# Patient Record
Sex: MALE | URBAN - METROPOLITAN AREA
[De-identification: names, ages, dates, MRNs, and addresses within clinical notes are randomized per-mention and may not be internally consistent; named-entity substitution may affect disease eponyms.]

---
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
## 2021-01-04 ENCOUNTER — APPOINTMENT (RX ONLY)
Dept: URBAN - METROPOLITAN AREA CLINIC 40 | Facility: CLINIC | Age: 21
Setting detail: DERMATOLOGY
End: 2021-01-04

## 2021-01-04 DIAGNOSIS — L80 VITILIGO: ICD-10-CM

## 2021-01-04 PROCEDURE — ? COUNSELING

## 2021-01-04 PROCEDURE — ? XTRAC LASER

## 2021-01-04 PROCEDURE — 96999 UNLISTED SPEC DERM SVC/PX: CPT

## 2021-01-04 PROCEDURE — ? ADDITIONAL NOTES

## 2021-01-04 ASSESSMENT — LOCATION DETAILED DESCRIPTION DERM
LOCATION DETAILED: DORSAL GLANS
LOCATION DETAILED: DORSAL PENILE SHAFT

## 2021-01-04 ASSESSMENT — LOCATION ZONE DERM: LOCATION ZONE: PENIS

## 2021-01-04 ASSESSMENT — LOCATION SIMPLE DESCRIPTION DERM: LOCATION SIMPLE: PENIS

## 2021-01-04 NOTE — PROCEDURE: XTRAC LASER
Billing: [id number] (Unlisted special dermatological service or procedure) Billing: [id number] (Unlisted special dermatological service or procedure)

## 2021-01-06 ENCOUNTER — APPOINTMENT (RX ONLY)
Dept: URBAN - METROPOLITAN AREA CLINIC 40 | Facility: CLINIC | Age: 21
Setting detail: DERMATOLOGY
End: 2021-01-06

## 2021-01-06 DIAGNOSIS — L80 VITILIGO: ICD-10-CM

## 2021-01-06 PROCEDURE — ? ADDITIONAL NOTES

## 2021-01-06 PROCEDURE — ? XTRAC LASER

## 2021-01-06 PROCEDURE — 96999 UNLISTED SPEC DERM SVC/PX: CPT

## 2021-01-06 PROCEDURE — ? COUNSELING

## 2021-01-06 ASSESSMENT — LOCATION SIMPLE DESCRIPTION DERM: LOCATION SIMPLE: PENIS

## 2021-01-06 ASSESSMENT — LOCATION DETAILED DESCRIPTION DERM
LOCATION DETAILED: DORSAL GLANS
LOCATION DETAILED: DORSAL PENILE SHAFT

## 2021-01-06 ASSESSMENT — LOCATION ZONE DERM: LOCATION ZONE: PENIS

## 2021-01-06 NOTE — PROCEDURE: XTRAC LASER
Billing: [id number] (Unlisted special dermatological service or procedure) Billing: [id number] (Unlisted special dermatological service or procedure)

## 2021-01-08 ENCOUNTER — APPOINTMENT (RX ONLY)
Dept: URBAN - METROPOLITAN AREA CLINIC 40 | Facility: CLINIC | Age: 21
Setting detail: DERMATOLOGY
End: 2021-01-08

## 2021-01-08 DIAGNOSIS — L80 VITILIGO: ICD-10-CM

## 2021-01-08 PROCEDURE — ? COUNSELING

## 2021-01-08 PROCEDURE — ? XTRAC LASER

## 2021-01-08 PROCEDURE — 96999 UNLISTED SPEC DERM SVC/PX: CPT

## 2021-01-08 ASSESSMENT — LOCATION DETAILED DESCRIPTION DERM
LOCATION DETAILED: DORSAL PENILE SHAFT
LOCATION DETAILED: DORSAL GLANS

## 2021-01-08 ASSESSMENT — LOCATION SIMPLE DESCRIPTION DERM: LOCATION SIMPLE: PENIS

## 2021-01-08 ASSESSMENT — LOCATION ZONE DERM: LOCATION ZONE: PENIS

## 2021-01-08 NOTE — PROCEDURE: XTRAC LASER
Billing: [id number] (Unlisted special dermatological service or procedure) Billing: [id number] (Unlisted special dermatological service or procedure)

## 2021-01-18 ENCOUNTER — APPOINTMENT (RX ONLY)
Dept: URBAN - METROPOLITAN AREA CLINIC 40 | Facility: CLINIC | Age: 21
Setting detail: DERMATOLOGY
End: 2021-01-18

## 2021-01-18 DIAGNOSIS — L80 VITILIGO: ICD-10-CM

## 2021-01-18 PROCEDURE — ? COUNSELING

## 2021-01-18 PROCEDURE — ? XTRAC LASER

## 2021-01-18 PROCEDURE — 96999 UNLISTED SPEC DERM SVC/PX: CPT

## 2021-01-18 ASSESSMENT — LOCATION SIMPLE DESCRIPTION DERM: LOCATION SIMPLE: PENIS

## 2021-01-18 ASSESSMENT — LOCATION DETAILED DESCRIPTION DERM
LOCATION DETAILED: DORSAL PENILE SHAFT
LOCATION DETAILED: DORSAL GLANS

## 2021-01-18 ASSESSMENT — LOCATION ZONE DERM: LOCATION ZONE: PENIS

## 2021-01-18 NOTE — PROCEDURE: XTRAC LASER
Billing: [id number] (Unlisted special dermatological service or procedure) Billing: [id number] (Unlisted special dermatological service or procedure)

## 2021-03-08 ENCOUNTER — APPOINTMENT (RX ONLY)
Dept: URBAN - METROPOLITAN AREA CLINIC 40 | Facility: CLINIC | Age: 21
Setting detail: DERMATOLOGY
End: 2021-03-08

## 2021-03-08 DIAGNOSIS — L80 VITILIGO: ICD-10-CM

## 2021-03-08 PROCEDURE — 96999 UNLISTED SPEC DERM SVC/PX: CPT

## 2021-03-08 PROCEDURE — ? XTRAC LASER

## 2021-03-08 PROCEDURE — ? COUNSELING

## 2021-03-08 ASSESSMENT — LOCATION SIMPLE DESCRIPTION DERM: LOCATION SIMPLE: PENIS

## 2021-03-08 ASSESSMENT — LOCATION DETAILED DESCRIPTION DERM
LOCATION DETAILED: DORSAL PENILE SHAFT
LOCATION DETAILED: DORSAL GLANS

## 2021-03-08 ASSESSMENT — LOCATION ZONE DERM: LOCATION ZONE: PENIS

## 2021-03-08 NOTE — PROCEDURE: XTRAC LASER
Billing: [id number] (Unlisted special dermatological service or procedure) Billing: [id number] (Unlisted special dermatological service or procedure)

## 2021-03-22 ENCOUNTER — APPOINTMENT (RX ONLY)
Dept: URBAN - METROPOLITAN AREA CLINIC 40 | Facility: CLINIC | Age: 21
Setting detail: DERMATOLOGY
End: 2021-03-22

## 2021-03-22 DIAGNOSIS — L80 VITILIGO: ICD-10-CM

## 2021-03-22 PROCEDURE — ? ADDITIONAL NOTES

## 2021-03-22 PROCEDURE — ? XTRAC LASER

## 2021-03-22 PROCEDURE — 96999 UNLISTED SPEC DERM SVC/PX: CPT

## 2021-03-22 PROCEDURE — ? COUNSELING

## 2021-03-22 ASSESSMENT — LOCATION ZONE DERM: LOCATION ZONE: PENIS

## 2021-03-22 ASSESSMENT — LOCATION DETAILED DESCRIPTION DERM
LOCATION DETAILED: DORSAL GLANS
LOCATION DETAILED: DORSAL PENILE SHAFT

## 2021-03-22 ASSESSMENT — LOCATION SIMPLE DESCRIPTION DERM: LOCATION SIMPLE: PENIS

## 2021-03-22 NOTE — PROCEDURE: XTRAC LASER
Billing: [id number] (Unlisted special dermatological service or procedure) Billing: [phone] (Unlisted special dermatological service or procedure)

## 2021-04-27 ENCOUNTER — APPOINTMENT (RX ONLY)
Dept: URBAN - METROPOLITAN AREA CLINIC 40 | Facility: CLINIC | Age: 21
Setting detail: DERMATOLOGY
End: 2021-04-27

## 2021-04-27 DIAGNOSIS — L80 VITILIGO: ICD-10-CM

## 2021-04-27 PROCEDURE — ? XTRAC LASER

## 2021-04-27 PROCEDURE — ? COUNSELING

## 2021-04-27 PROCEDURE — ? ADDITIONAL NOTES

## 2021-04-27 PROCEDURE — 96999 UNLISTED SPEC DERM SVC/PX: CPT

## 2021-04-27 ASSESSMENT — LOCATION DETAILED DESCRIPTION DERM
LOCATION DETAILED: DORSAL GLANS
LOCATION DETAILED: DORSAL PENILE SHAFT

## 2021-04-27 ASSESSMENT — LOCATION ZONE DERM: LOCATION ZONE: PENIS

## 2021-04-27 ASSESSMENT — LOCATION SIMPLE DESCRIPTION DERM: LOCATION SIMPLE: PENIS

## 2021-04-27 NOTE — PROCEDURE: XTRAC LASER
Billing: [id number] (Unlisted special dermatological service or procedure) Billing: [phone] (Unlisted special dermatological service or procedure)

## 2021-05-17 ENCOUNTER — APPOINTMENT (RX ONLY)
Dept: URBAN - METROPOLITAN AREA CLINIC 40 | Facility: CLINIC | Age: 21
Setting detail: DERMATOLOGY
End: 2021-05-17

## 2021-05-17 DIAGNOSIS — L80 VITILIGO: ICD-10-CM

## 2021-05-17 PROCEDURE — ? ADDITIONAL NOTES

## 2021-05-17 PROCEDURE — ? COUNSELING

## 2021-05-17 PROCEDURE — ? XTRAC LASER

## 2021-05-17 PROCEDURE — 96999 UNLISTED SPEC DERM SVC/PX: CPT

## 2021-05-17 ASSESSMENT — LOCATION DETAILED DESCRIPTION DERM
LOCATION DETAILED: DORSAL GLANS
LOCATION DETAILED: DORSAL PENILE SHAFT

## 2021-05-17 ASSESSMENT — LOCATION ZONE DERM: LOCATION ZONE: PENIS

## 2021-05-17 ASSESSMENT — LOCATION SIMPLE DESCRIPTION DERM: LOCATION SIMPLE: PENIS

## 2021-05-17 NOTE — PROCEDURE: XTRAC LASER
Billing: [id number] (Unlisted special dermatological service or procedure) Billing: [id number] (Unlisted special dermatological service or procedure)

## 2021-05-19 ENCOUNTER — APPOINTMENT (RX ONLY)
Dept: URBAN - METROPOLITAN AREA CLINIC 40 | Facility: CLINIC | Age: 21
Setting detail: DERMATOLOGY
End: 2021-05-19

## 2021-05-19 DIAGNOSIS — L80 VITILIGO: ICD-10-CM

## 2021-05-19 PROCEDURE — ? ADDITIONAL NOTES

## 2021-05-19 PROCEDURE — ? XTRAC LASER

## 2021-05-19 PROCEDURE — ? COUNSELING

## 2021-05-19 PROCEDURE — 96999 UNLISTED SPEC DERM SVC/PX: CPT

## 2021-05-19 ASSESSMENT — LOCATION DETAILED DESCRIPTION DERM
LOCATION DETAILED: DORSAL GLANS
LOCATION DETAILED: DORSAL PENILE SHAFT

## 2021-05-19 ASSESSMENT — LOCATION SIMPLE DESCRIPTION DERM: LOCATION SIMPLE: PENIS

## 2021-05-19 ASSESSMENT — LOCATION ZONE DERM: LOCATION ZONE: PENIS

## 2021-05-19 NOTE — PROCEDURE: XTRAC LASER
Billing: [id number] (Unlisted special dermatological service or procedure) Billing: [id number] (Unlisted special dermatological service or procedure)

## 2021-05-21 ENCOUNTER — APPOINTMENT (RX ONLY)
Dept: URBAN - METROPOLITAN AREA CLINIC 40 | Facility: CLINIC | Age: 21
Setting detail: DERMATOLOGY
End: 2021-05-21

## 2021-05-21 DIAGNOSIS — L80 VITILIGO: ICD-10-CM

## 2021-05-21 PROCEDURE — ? ADDITIONAL NOTES

## 2021-05-21 PROCEDURE — 96999 UNLISTED SPEC DERM SVC/PX: CPT

## 2021-05-21 PROCEDURE — ? COUNSELING

## 2021-05-21 PROCEDURE — ? XTRAC LASER

## 2021-05-21 ASSESSMENT — LOCATION ZONE DERM: LOCATION ZONE: PENIS

## 2021-05-21 ASSESSMENT — LOCATION DETAILED DESCRIPTION DERM
LOCATION DETAILED: DORSAL PENILE SHAFT
LOCATION DETAILED: DORSAL GLANS

## 2021-05-21 ASSESSMENT — LOCATION SIMPLE DESCRIPTION DERM: LOCATION SIMPLE: PENIS

## 2021-05-21 NOTE — PROCEDURE: XTRAC LASER
Billing: [id number] (Unlisted special dermatological service or procedure) Billing: [phone] (Unlisted special dermatological service or procedure)

## 2021-05-24 ENCOUNTER — APPOINTMENT (RX ONLY)
Dept: URBAN - METROPOLITAN AREA CLINIC 40 | Facility: CLINIC | Age: 21
Setting detail: DERMATOLOGY
End: 2021-05-24

## 2021-05-24 DIAGNOSIS — L80 VITILIGO: ICD-10-CM

## 2021-05-24 PROCEDURE — ? ADDITIONAL NOTES

## 2021-05-24 PROCEDURE — 96999 UNLISTED SPEC DERM SVC/PX: CPT

## 2021-05-24 PROCEDURE — ? XTRAC LASER

## 2021-05-24 PROCEDURE — ? COUNSELING

## 2021-05-24 ASSESSMENT — LOCATION DETAILED DESCRIPTION DERM
LOCATION DETAILED: DORSAL PENILE SHAFT
LOCATION DETAILED: DORSAL GLANS

## 2021-05-24 ASSESSMENT — LOCATION ZONE DERM: LOCATION ZONE: PENIS

## 2021-05-24 ASSESSMENT — LOCATION SIMPLE DESCRIPTION DERM: LOCATION SIMPLE: PENIS

## 2021-05-24 NOTE — PROCEDURE: XTRAC LASER
Billing: [id number] (Unlisted special dermatological service or procedure) Billing: [phone] (Unlisted special dermatological service or procedure)

## 2021-05-26 ENCOUNTER — APPOINTMENT (RX ONLY)
Dept: URBAN - METROPOLITAN AREA CLINIC 40 | Facility: CLINIC | Age: 21
Setting detail: DERMATOLOGY
End: 2021-05-26

## 2021-05-26 DIAGNOSIS — L80 VITILIGO: ICD-10-CM

## 2021-05-26 PROCEDURE — 96999 UNLISTED SPEC DERM SVC/PX: CPT

## 2021-05-26 PROCEDURE — ? ADDITIONAL NOTES

## 2021-05-26 PROCEDURE — ? COUNSELING

## 2021-05-26 PROCEDURE — ? XTRAC LASER

## 2021-05-26 ASSESSMENT — LOCATION SIMPLE DESCRIPTION DERM: LOCATION SIMPLE: PENIS

## 2021-05-26 ASSESSMENT — LOCATION ZONE DERM: LOCATION ZONE: PENIS

## 2021-05-26 ASSESSMENT — LOCATION DETAILED DESCRIPTION DERM
LOCATION DETAILED: DORSAL PENILE SHAFT
LOCATION DETAILED: DORSAL GLANS

## 2021-05-28 ENCOUNTER — APPOINTMENT (RX ONLY)
Dept: URBAN - METROPOLITAN AREA CLINIC 40 | Facility: CLINIC | Age: 21
Setting detail: DERMATOLOGY
End: 2021-05-28

## 2021-05-28 DIAGNOSIS — L80 VITILIGO: ICD-10-CM

## 2021-05-28 PROCEDURE — ? XTRAC LASER

## 2021-05-28 PROCEDURE — ? COUNSELING

## 2021-05-28 PROCEDURE — ? ADDITIONAL NOTES

## 2021-05-28 PROCEDURE — 96999 UNLISTED SPEC DERM SVC/PX: CPT

## 2021-05-28 ASSESSMENT — LOCATION DETAILED DESCRIPTION DERM
LOCATION DETAILED: DORSAL PENILE SHAFT
LOCATION DETAILED: DORSAL GLANS

## 2021-05-28 ASSESSMENT — LOCATION ZONE DERM: LOCATION ZONE: PENIS

## 2021-05-28 ASSESSMENT — LOCATION SIMPLE DESCRIPTION DERM: LOCATION SIMPLE: PENIS

## 2021-05-28 NOTE — PROCEDURE: XTRAC LASER
Billing: [phone] (Unlisted special dermatological service or procedure) Billing: [id number] (Unlisted special dermatological service or procedure)

## 2021-06-02 ENCOUNTER — APPOINTMENT (RX ONLY)
Dept: URBAN - METROPOLITAN AREA CLINIC 40 | Facility: CLINIC | Age: 21
Setting detail: DERMATOLOGY
End: 2021-06-02

## 2021-06-02 DIAGNOSIS — L80 VITILIGO: ICD-10-CM

## 2021-06-02 PROCEDURE — ? XTRAC LASER

## 2021-06-02 PROCEDURE — 96999 UNLISTED SPEC DERM SVC/PX: CPT

## 2021-06-02 PROCEDURE — ? ADDITIONAL NOTES

## 2021-06-02 PROCEDURE — ? COUNSELING

## 2021-06-02 ASSESSMENT — LOCATION DETAILED DESCRIPTION DERM
LOCATION DETAILED: DORSAL GLANS
LOCATION DETAILED: DORSAL PENILE SHAFT

## 2021-06-02 ASSESSMENT — LOCATION ZONE DERM: LOCATION ZONE: PENIS

## 2021-06-02 ASSESSMENT — LOCATION SIMPLE DESCRIPTION DERM: LOCATION SIMPLE: PENIS

## 2021-06-02 NOTE — PROCEDURE: XTRAC LASER
Billing: [id number] (Unlisted special dermatological service or procedure) Billing: [id number] (Unlisted special dermatological service or procedure)

## 2021-06-04 ENCOUNTER — APPOINTMENT (RX ONLY)
Dept: URBAN - METROPOLITAN AREA CLINIC 40 | Facility: CLINIC | Age: 21
Setting detail: DERMATOLOGY
End: 2021-06-04

## 2021-06-04 DIAGNOSIS — L80 VITILIGO: ICD-10-CM

## 2021-06-04 PROCEDURE — ? ADDITIONAL NOTES

## 2021-06-04 PROCEDURE — 96999 UNLISTED SPEC DERM SVC/PX: CPT

## 2021-06-04 PROCEDURE — ? COUNSELING

## 2021-06-04 PROCEDURE — ? XTRAC LASER

## 2021-06-04 ASSESSMENT — LOCATION DETAILED DESCRIPTION DERM
LOCATION DETAILED: DORSAL GLANS
LOCATION DETAILED: DORSAL PENILE SHAFT

## 2021-06-04 ASSESSMENT — LOCATION SIMPLE DESCRIPTION DERM: LOCATION SIMPLE: PENIS

## 2021-06-04 ASSESSMENT — LOCATION ZONE DERM: LOCATION ZONE: PENIS

## 2021-06-04 NOTE — PROCEDURE: XTRAC LASER
Billing: [id number] (Unlisted special dermatological service or procedure) Billing: [id number] (Unlisted special dermatological service or procedure)

## 2021-06-11 ENCOUNTER — APPOINTMENT (RX ONLY)
Dept: URBAN - METROPOLITAN AREA CLINIC 40 | Facility: CLINIC | Age: 21
Setting detail: DERMATOLOGY
End: 2021-06-11

## 2021-06-11 DIAGNOSIS — L80 VITILIGO: ICD-10-CM

## 2021-06-11 PROCEDURE — 96999 UNLISTED SPEC DERM SVC/PX: CPT

## 2021-06-11 PROCEDURE — ? ADDITIONAL NOTES

## 2021-06-11 PROCEDURE — ? COUNSELING

## 2021-06-11 PROCEDURE — ? XTRAC LASER

## 2021-06-11 ASSESSMENT — LOCATION SIMPLE DESCRIPTION DERM: LOCATION SIMPLE: PENIS

## 2021-06-11 ASSESSMENT — LOCATION ZONE DERM: LOCATION ZONE: PENIS

## 2021-06-11 ASSESSMENT — LOCATION DETAILED DESCRIPTION DERM
LOCATION DETAILED: DORSAL GLANS
LOCATION DETAILED: DORSAL PENILE SHAFT

## 2021-06-11 NOTE — PROCEDURE: XTRAC LASER
Billing: [id number] (Unlisted special dermatological service or procedure) Billing: [phone] (Unlisted special dermatological service or procedure)

## 2021-06-14 ENCOUNTER — APPOINTMENT (RX ONLY)
Dept: URBAN - METROPOLITAN AREA CLINIC 40 | Facility: CLINIC | Age: 21
Setting detail: DERMATOLOGY
End: 2021-06-14

## 2021-06-14 DIAGNOSIS — L71.0 PERIORAL DERMATITIS: ICD-10-CM | Status: INADEQUATELY CONTROLLED

## 2021-06-14 DIAGNOSIS — L80 VITILIGO: ICD-10-CM

## 2021-06-14 PROCEDURE — 99214 OFFICE O/P EST MOD 30 MIN: CPT | Mod: 25

## 2021-06-14 PROCEDURE — ? XTRAC LASER

## 2021-06-14 PROCEDURE — ? PRESCRIPTION

## 2021-06-14 PROCEDURE — 96999 UNLISTED SPEC DERM SVC/PX: CPT

## 2021-06-14 PROCEDURE — ? TREATMENT REGIMEN

## 2021-06-14 PROCEDURE — ? DIAGNOSIS COMMENT

## 2021-06-14 PROCEDURE — ? COUNSELING

## 2021-06-14 PROCEDURE — ? ADDITIONAL NOTES

## 2021-06-14 RX ORDER — DAPSONE 75 MG/G
GEL TOPICAL
Qty: 1 | Refills: 2 | Status: ERX | COMMUNITY
Start: 2021-06-14

## 2021-06-14 RX ORDER — DOXYCYCLINE 100 MG/1
CAPSULE ORAL
Qty: 30 | Refills: 2 | Status: ERX | COMMUNITY
Start: 2021-06-14

## 2021-06-14 RX ADMIN — DAPSONE: 75 GEL TOPICAL at 00:00

## 2021-06-14 RX ADMIN — DOXYCYCLINE: 100 CAPSULE ORAL at 00:00

## 2021-06-14 ASSESSMENT — LOCATION SIMPLE DESCRIPTION DERM
LOCATION SIMPLE: PENIS
LOCATION SIMPLE: LEFT CHEEK

## 2021-06-14 ASSESSMENT — LOCATION DETAILED DESCRIPTION DERM
LOCATION DETAILED: DORSAL PENILE SHAFT
LOCATION DETAILED: DORSAL GLANS
LOCATION DETAILED: LEFT MEDIAL MALAR CHEEK

## 2021-06-14 ASSESSMENT — LOCATION ZONE DERM
LOCATION ZONE: PENIS
LOCATION ZONE: FACE

## 2021-06-14 NOTE — PROCEDURE: DIAGNOSIS COMMENT
Render Risk Assessment In Note?: no
Detail Level: Simple
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 Course of DCN and topical dapsone has helped before. Topical metronidazole did not help

## 2021-06-14 NOTE — PROCEDURE: TREATMENT REGIMEN
Detail Level: Zone
Initiate Treatment: - DCN 100mg 1 po qd x 3 months \\n- Dapsone 7.5% gel QAM (pt said this worked in the past from another dermatologist)

## 2021-06-14 NOTE — PROCEDURE: XTRAC LASER
Billing: [id number] (Unlisted special dermatological service or procedure) Billing: [id number] (Unlisted special dermatological service or procedure)

## 2021-06-16 ENCOUNTER — APPOINTMENT (RX ONLY)
Dept: URBAN - METROPOLITAN AREA CLINIC 40 | Facility: CLINIC | Age: 21
Setting detail: DERMATOLOGY
End: 2021-06-16

## 2021-06-16 DIAGNOSIS — L80 VITILIGO: ICD-10-CM

## 2021-06-16 PROCEDURE — ? COUNSELING

## 2021-06-16 PROCEDURE — 96999 UNLISTED SPEC DERM SVC/PX: CPT

## 2021-06-16 PROCEDURE — ? XTRAC LASER

## 2021-06-16 PROCEDURE — ? ADDITIONAL NOTES

## 2021-06-16 ASSESSMENT — LOCATION DETAILED DESCRIPTION DERM
LOCATION DETAILED: DORSAL PENILE SHAFT
LOCATION DETAILED: DORSAL GLANS

## 2021-06-16 ASSESSMENT — LOCATION SIMPLE DESCRIPTION DERM: LOCATION SIMPLE: PENIS

## 2021-06-16 ASSESSMENT — LOCATION ZONE DERM: LOCATION ZONE: PENIS

## 2021-06-16 NOTE — PROCEDURE: XTRAC LASER
Billing: [id number] (Unlisted special dermatological service or procedure) Billing: [id number] (Unlisted special dermatological service or procedure)

## 2021-06-23 ENCOUNTER — APPOINTMENT (RX ONLY)
Dept: URBAN - METROPOLITAN AREA CLINIC 40 | Facility: CLINIC | Age: 21
Setting detail: DERMATOLOGY
End: 2021-06-23

## 2021-06-23 DIAGNOSIS — L80 VITILIGO: ICD-10-CM

## 2021-06-23 PROCEDURE — 96999 UNLISTED SPEC DERM SVC/PX: CPT

## 2021-06-23 PROCEDURE — ? COUNSELING

## 2021-06-23 PROCEDURE — ? XTRAC LASER

## 2021-06-23 PROCEDURE — ? ADDITIONAL NOTES

## 2021-06-23 ASSESSMENT — LOCATION DETAILED DESCRIPTION DERM
LOCATION DETAILED: DORSAL GLANS
LOCATION DETAILED: DORSAL PENILE SHAFT

## 2021-06-23 ASSESSMENT — LOCATION SIMPLE DESCRIPTION DERM: LOCATION SIMPLE: PENIS

## 2021-06-23 ASSESSMENT — LOCATION ZONE DERM: LOCATION ZONE: PENIS

## 2021-06-23 NOTE — PROCEDURE: XTRAC LASER
Billing: [id number] (Unlisted special dermatological service or procedure) Billing: [phone] (Unlisted special dermatological service or procedure)

## 2021-06-24 ENCOUNTER — RX ONLY (OUTPATIENT)
Age: 21
Setting detail: RX ONLY
End: 2021-06-24

## 2021-06-24 RX ORDER — DAPSONE 75 MG/G
GEL TOPICAL
Qty: 1 | Refills: 2 | Status: ERX

## 2021-06-25 ENCOUNTER — APPOINTMENT (RX ONLY)
Dept: URBAN - METROPOLITAN AREA CLINIC 40 | Facility: CLINIC | Age: 21
Setting detail: DERMATOLOGY
End: 2021-06-25

## 2021-06-25 DIAGNOSIS — L80 VITILIGO: ICD-10-CM

## 2021-06-25 PROCEDURE — ? ADDITIONAL NOTES

## 2021-06-25 PROCEDURE — 96999 UNLISTED SPEC DERM SVC/PX: CPT

## 2021-06-25 PROCEDURE — ? XTRAC LASER

## 2021-06-25 PROCEDURE — ? COUNSELING

## 2021-06-25 ASSESSMENT — LOCATION DETAILED DESCRIPTION DERM
LOCATION DETAILED: DORSAL PENILE SHAFT
LOCATION DETAILED: DORSAL GLANS

## 2021-06-25 ASSESSMENT — LOCATION SIMPLE DESCRIPTION DERM: LOCATION SIMPLE: PENIS

## 2021-06-25 ASSESSMENT — LOCATION ZONE DERM: LOCATION ZONE: PENIS

## 2021-06-25 NOTE — PROCEDURE: XTRAC LASER
Billing: [id number] (Unlisted special dermatological service or procedure) Billing: [id number] (Unlisted special dermatological service or procedure)

## 2021-07-02 ENCOUNTER — APPOINTMENT (RX ONLY)
Dept: URBAN - METROPOLITAN AREA CLINIC 40 | Facility: CLINIC | Age: 21
Setting detail: DERMATOLOGY
End: 2021-07-02

## 2021-07-02 DIAGNOSIS — L80 VITILIGO: ICD-10-CM

## 2021-07-02 PROCEDURE — ? ADDITIONAL NOTES

## 2021-07-02 PROCEDURE — 96999 UNLISTED SPEC DERM SVC/PX: CPT

## 2021-07-02 PROCEDURE — ? XTRAC LASER

## 2021-07-02 PROCEDURE — ? COUNSELING

## 2021-07-02 ASSESSMENT — LOCATION DETAILED DESCRIPTION DERM
LOCATION DETAILED: DORSAL PENILE SHAFT
LOCATION DETAILED: DORSAL GLANS

## 2021-07-02 ASSESSMENT — LOCATION ZONE DERM: LOCATION ZONE: PENIS

## 2021-07-02 ASSESSMENT — LOCATION SIMPLE DESCRIPTION DERM: LOCATION SIMPLE: PENIS

## 2021-07-02 NOTE — PROCEDURE: XTRAC LASER
Billing: [id number] (Unlisted special dermatological service or procedure) Billing: [id number] (Unlisted special dermatological service or procedure)

## 2021-07-07 ENCOUNTER — APPOINTMENT (RX ONLY)
Dept: URBAN - METROPOLITAN AREA CLINIC 40 | Facility: CLINIC | Age: 21
Setting detail: DERMATOLOGY
End: 2021-07-07

## 2021-07-07 DIAGNOSIS — L80 VITILIGO: ICD-10-CM

## 2021-07-07 PROCEDURE — 96999 UNLISTED SPEC DERM SVC/PX: CPT

## 2021-07-07 PROCEDURE — ? ADDITIONAL NOTES

## 2021-07-07 PROCEDURE — ? XTRAC LASER

## 2021-07-07 PROCEDURE — ? COUNSELING

## 2021-07-07 ASSESSMENT — LOCATION ZONE DERM: LOCATION ZONE: PENIS

## 2021-07-07 ASSESSMENT — LOCATION SIMPLE DESCRIPTION DERM: LOCATION SIMPLE: PENIS

## 2021-07-07 ASSESSMENT — LOCATION DETAILED DESCRIPTION DERM
LOCATION DETAILED: DORSAL GLANS
LOCATION DETAILED: DORSAL PENILE SHAFT

## 2021-07-07 NOTE — PROCEDURE: XTRAC LASER
Billing: [id number] (Unlisted special dermatological service or procedure) Billing: [phone] (Unlisted special dermatological service or procedure)

## 2021-07-09 ENCOUNTER — APPOINTMENT (RX ONLY)
Dept: URBAN - METROPOLITAN AREA CLINIC 40 | Facility: CLINIC | Age: 21
Setting detail: DERMATOLOGY
End: 2021-07-09

## 2021-07-09 DIAGNOSIS — L80 VITILIGO: ICD-10-CM

## 2021-07-09 PROCEDURE — ? ADDITIONAL NOTES

## 2021-07-09 PROCEDURE — ? XTRAC LASER

## 2021-07-09 PROCEDURE — 96999 UNLISTED SPEC DERM SVC/PX: CPT

## 2021-07-09 PROCEDURE — ? COUNSELING

## 2021-07-09 ASSESSMENT — LOCATION DETAILED DESCRIPTION DERM
LOCATION DETAILED: DORSAL GLANS
LOCATION DETAILED: DORSAL PENILE SHAFT

## 2021-07-09 ASSESSMENT — LOCATION ZONE DERM: LOCATION ZONE: PENIS

## 2021-07-09 ASSESSMENT — LOCATION SIMPLE DESCRIPTION DERM: LOCATION SIMPLE: PENIS

## 2021-07-09 NOTE — PROCEDURE: XTRAC LASER
Billing: 01456 (Unlisted special dermatological service or procedure) Billing: [id number] (Unlisted special dermatological service or procedure)

## 2021-07-12 ENCOUNTER — APPOINTMENT (RX ONLY)
Dept: URBAN - METROPOLITAN AREA CLINIC 40 | Facility: CLINIC | Age: 21
Setting detail: DERMATOLOGY
End: 2021-07-12

## 2021-07-12 DIAGNOSIS — L80 VITILIGO: ICD-10-CM

## 2021-07-12 PROCEDURE — ? XTRAC LASER

## 2021-07-12 PROCEDURE — ? COUNSELING

## 2021-07-12 PROCEDURE — ? ADDITIONAL NOTES

## 2021-07-12 PROCEDURE — 96999 UNLISTED SPEC DERM SVC/PX: CPT

## 2021-07-12 ASSESSMENT — LOCATION DETAILED DESCRIPTION DERM
LOCATION DETAILED: DORSAL PENILE SHAFT
LOCATION DETAILED: DORSAL GLANS

## 2021-07-12 ASSESSMENT — LOCATION ZONE DERM: LOCATION ZONE: PENIS

## 2021-07-12 ASSESSMENT — LOCATION SIMPLE DESCRIPTION DERM: LOCATION SIMPLE: PENIS

## 2021-08-10 ENCOUNTER — APPOINTMENT (RX ONLY)
Dept: URBAN - METROPOLITAN AREA CLINIC 40 | Facility: CLINIC | Age: 21
Setting detail: DERMATOLOGY
End: 2021-08-10

## 2021-08-10 DIAGNOSIS — L80 VITILIGO: ICD-10-CM

## 2021-08-10 PROCEDURE — ? COUNSELING

## 2021-08-10 PROCEDURE — ? ADDITIONAL NOTES

## 2021-08-10 PROCEDURE — 96999 UNLISTED SPEC DERM SVC/PX: CPT

## 2021-08-10 PROCEDURE — ? XTRAC LASER

## 2021-08-10 ASSESSMENT — LOCATION ZONE DERM: LOCATION ZONE: PENIS

## 2021-08-10 ASSESSMENT — LOCATION DETAILED DESCRIPTION DERM
LOCATION DETAILED: DORSAL PENILE SHAFT
LOCATION DETAILED: DORSAL GLANS

## 2021-08-10 ASSESSMENT — LOCATION SIMPLE DESCRIPTION DERM: LOCATION SIMPLE: PENIS

## 2021-08-10 NOTE — PROCEDURE: XTRAC LASER
Billing: [id number] (Unlisted special dermatological service or procedure) Billing: [id number] (Unlisted special dermatological service or procedure)

## 2021-11-24 ENCOUNTER — APPOINTMENT (RX ONLY)
Dept: URBAN - METROPOLITAN AREA CLINIC 40 | Facility: CLINIC | Age: 21
Setting detail: DERMATOLOGY
End: 2021-11-24

## 2021-11-24 DIAGNOSIS — L80 VITILIGO: ICD-10-CM

## 2021-11-24 PROCEDURE — ? COUNSELING

## 2021-11-24 PROCEDURE — ? ADDITIONAL NOTES

## 2021-11-24 PROCEDURE — 96999 UNLISTED SPEC DERM SVC/PX: CPT

## 2021-11-24 PROCEDURE — ? XTRAC LASER

## 2021-11-24 ASSESSMENT — LOCATION SIMPLE DESCRIPTION DERM: LOCATION SIMPLE: PENIS

## 2021-11-24 ASSESSMENT — LOCATION DETAILED DESCRIPTION DERM
LOCATION DETAILED: DORSAL PENILE SHAFT
LOCATION DETAILED: DORSAL GLANS

## 2021-11-24 ASSESSMENT — LOCATION ZONE DERM: LOCATION ZONE: PENIS

## 2021-11-24 NOTE — PROCEDURE: XTRAC LASER
Billing: [id number] (Unlisted special dermatological service or procedure) Billing: [id number] (Unlisted special dermatological service or procedure)

## 2021-12-10 ENCOUNTER — APPOINTMENT (RX ONLY)
Dept: URBAN - METROPOLITAN AREA CLINIC 40 | Facility: CLINIC | Age: 21
Setting detail: DERMATOLOGY
End: 2021-12-10

## 2021-12-10 DIAGNOSIS — L80 VITILIGO: ICD-10-CM

## 2021-12-10 PROCEDURE — ? XTRAC LASER

## 2021-12-10 PROCEDURE — 96999 UNLISTED SPEC DERM SVC/PX: CPT

## 2021-12-10 PROCEDURE — ? ADDITIONAL NOTES

## 2021-12-10 PROCEDURE — ? COUNSELING

## 2021-12-10 ASSESSMENT — LOCATION ZONE DERM: LOCATION ZONE: PENIS

## 2021-12-10 ASSESSMENT — LOCATION DETAILED DESCRIPTION DERM
LOCATION DETAILED: DORSAL PENILE SHAFT
LOCATION DETAILED: DORSAL GLANS

## 2021-12-10 ASSESSMENT — LOCATION SIMPLE DESCRIPTION DERM: LOCATION SIMPLE: PENIS

## 2021-12-10 NOTE — PROCEDURE: XTRAC LASER
Billing: [id number] (Unlisted special dermatological service or procedure) Billing: [id number] (Unlisted special dermatological service or procedure)

## 2021-12-16 ENCOUNTER — APPOINTMENT (RX ONLY)
Dept: URBAN - METROPOLITAN AREA CLINIC 40 | Facility: CLINIC | Age: 21
Setting detail: DERMATOLOGY
End: 2021-12-16

## 2021-12-16 DIAGNOSIS — L80 VITILIGO: ICD-10-CM

## 2021-12-16 PROCEDURE — 96999 UNLISTED SPEC DERM SVC/PX: CPT

## 2021-12-16 PROCEDURE — ? XTRAC LASER

## 2021-12-16 PROCEDURE — ? COUNSELING

## 2021-12-16 PROCEDURE — ? ADDITIONAL NOTES

## 2021-12-16 ASSESSMENT — LOCATION DETAILED DESCRIPTION DERM
LOCATION DETAILED: DORSAL GLANS
LOCATION DETAILED: DORSAL PENILE SHAFT

## 2021-12-16 ASSESSMENT — LOCATION SIMPLE DESCRIPTION DERM: LOCATION SIMPLE: PENIS

## 2021-12-16 ASSESSMENT — LOCATION ZONE DERM: LOCATION ZONE: PENIS

## 2021-12-16 NOTE — PROCEDURE: XTRAC LASER
Billing: [id number] (Unlisted special dermatological service or procedure) Billing: [phone] (Unlisted special dermatological service or procedure)

## 2021-12-22 ENCOUNTER — APPOINTMENT (RX ONLY)
Dept: URBAN - METROPOLITAN AREA CLINIC 40 | Facility: CLINIC | Age: 21
Setting detail: DERMATOLOGY
End: 2021-12-22

## 2021-12-22 DIAGNOSIS — L80 VITILIGO: ICD-10-CM

## 2021-12-22 PROCEDURE — ? COUNSELING

## 2021-12-22 PROCEDURE — ? ADDITIONAL NOTES

## 2021-12-22 PROCEDURE — ? XTRAC LASER

## 2021-12-22 PROCEDURE — 96999 UNLISTED SPEC DERM SVC/PX: CPT

## 2021-12-22 ASSESSMENT — LOCATION ZONE DERM: LOCATION ZONE: PENIS

## 2021-12-22 ASSESSMENT — LOCATION DETAILED DESCRIPTION DERM
LOCATION DETAILED: DORSAL GLANS
LOCATION DETAILED: DORSAL PENILE SHAFT

## 2021-12-22 ASSESSMENT — LOCATION SIMPLE DESCRIPTION DERM: LOCATION SIMPLE: PENIS

## 2021-12-22 NOTE — PROCEDURE: XTRAC LASER
Billing: [id number] (Unlisted special dermatological service or procedure) Billing: [id number] (Unlisted special dermatological service or procedure)

## 2022-01-11 ENCOUNTER — APPOINTMENT (RX ONLY)
Dept: URBAN - METROPOLITAN AREA CLINIC 40 | Facility: CLINIC | Age: 22
Setting detail: DERMATOLOGY
End: 2022-01-11

## 2022-01-11 DIAGNOSIS — L80 VITILIGO: ICD-10-CM

## 2022-01-11 PROCEDURE — ? ADDITIONAL NOTES

## 2022-01-11 PROCEDURE — 96999 UNLISTED SPEC DERM SVC/PX: CPT

## 2022-01-11 PROCEDURE — ? XTRAC LASER

## 2022-01-11 PROCEDURE — ? COUNSELING

## 2022-01-11 ASSESSMENT — LOCATION DETAILED DESCRIPTION DERM
LOCATION DETAILED: DORSAL GLANS
LOCATION DETAILED: DORSAL PENILE SHAFT

## 2022-01-11 ASSESSMENT — LOCATION ZONE DERM: LOCATION ZONE: PENIS

## 2022-01-11 ASSESSMENT — LOCATION SIMPLE DESCRIPTION DERM: LOCATION SIMPLE: PENIS

## 2022-01-11 NOTE — PROCEDURE: XTRAC LASER
Billing: 11514 (Unlisted special dermatological service or procedure) Billing: [id number] (Unlisted special dermatological service or procedure)

## 2022-01-13 ENCOUNTER — APPOINTMENT (RX ONLY)
Dept: URBAN - METROPOLITAN AREA CLINIC 40 | Facility: CLINIC | Age: 22
Setting detail: DERMATOLOGY
End: 2022-01-13

## 2022-01-13 DIAGNOSIS — L80 VITILIGO: ICD-10-CM

## 2022-01-13 PROCEDURE — ? XTRAC LASER

## 2022-01-13 PROCEDURE — ? COUNSELING

## 2022-01-13 PROCEDURE — 96999 UNLISTED SPEC DERM SVC/PX: CPT

## 2022-01-13 PROCEDURE — ? ADDITIONAL NOTES

## 2022-01-13 ASSESSMENT — LOCATION DETAILED DESCRIPTION DERM
LOCATION DETAILED: DORSAL GLANS
LOCATION DETAILED: DORSAL PENILE SHAFT

## 2022-01-13 ASSESSMENT — LOCATION ZONE DERM: LOCATION ZONE: PENIS

## 2022-01-13 ASSESSMENT — LOCATION SIMPLE DESCRIPTION DERM: LOCATION SIMPLE: PENIS

## 2022-01-13 NOTE — PROCEDURE: XTRAC LASER
Billing: [id number] (Unlisted special dermatological service or procedure) Billing: [id number] (Unlisted special dermatological service or procedure)

## 2022-01-17 ENCOUNTER — APPOINTMENT (RX ONLY)
Dept: URBAN - METROPOLITAN AREA CLINIC 40 | Facility: CLINIC | Age: 22
Setting detail: DERMATOLOGY
End: 2022-01-17

## 2022-01-17 DIAGNOSIS — L80 VITILIGO: ICD-10-CM

## 2022-01-17 PROCEDURE — ? ADDITIONAL NOTES

## 2022-01-17 PROCEDURE — ? XTRAC LASER

## 2022-01-17 PROCEDURE — 96999 UNLISTED SPEC DERM SVC/PX: CPT

## 2022-01-17 PROCEDURE — ? COUNSELING

## 2022-01-17 ASSESSMENT — LOCATION ZONE DERM: LOCATION ZONE: PENIS

## 2022-01-17 ASSESSMENT — LOCATION DETAILED DESCRIPTION DERM
LOCATION DETAILED: DORSAL PENILE SHAFT
LOCATION DETAILED: DORSAL GLANS

## 2022-01-17 ASSESSMENT — LOCATION SIMPLE DESCRIPTION DERM: LOCATION SIMPLE: PENIS

## 2022-01-17 NOTE — PROCEDURE: XTRAC LASER
Billing: [id number] (Unlisted special dermatological service or procedure) Billing: [id number] (Unlisted special dermatological service or procedure)

## 2022-01-20 ENCOUNTER — APPOINTMENT (RX ONLY)
Dept: URBAN - METROPOLITAN AREA CLINIC 40 | Facility: CLINIC | Age: 22
Setting detail: DERMATOLOGY
End: 2022-01-20

## 2022-01-20 DIAGNOSIS — L80 VITILIGO: ICD-10-CM

## 2022-01-20 PROCEDURE — 96999 UNLISTED SPEC DERM SVC/PX: CPT

## 2022-01-20 PROCEDURE — ? COUNSELING

## 2022-01-20 PROCEDURE — ? XTRAC LASER

## 2022-01-20 PROCEDURE — ? ADDITIONAL NOTES

## 2022-01-20 ASSESSMENT — LOCATION DETAILED DESCRIPTION DERM
LOCATION DETAILED: DORSAL GLANS
LOCATION DETAILED: DORSAL PENILE SHAFT

## 2022-01-20 ASSESSMENT — LOCATION SIMPLE DESCRIPTION DERM: LOCATION SIMPLE: PENIS

## 2022-01-20 ASSESSMENT — LOCATION ZONE DERM: LOCATION ZONE: PENIS

## 2022-01-20 NOTE — PROCEDURE: XTRAC LASER
Billing: [id number] (Unlisted special dermatological service or procedure) Billing: [id number] (Unlisted special dermatological service or procedure)

## 2022-01-25 ENCOUNTER — APPOINTMENT (RX ONLY)
Dept: URBAN - METROPOLITAN AREA CLINIC 40 | Facility: CLINIC | Age: 22
Setting detail: DERMATOLOGY
End: 2022-01-25

## 2022-01-25 DIAGNOSIS — L80 VITILIGO: ICD-10-CM

## 2022-01-25 PROCEDURE — 96999 UNLISTED SPEC DERM SVC/PX: CPT

## 2022-01-25 PROCEDURE — ? COUNSELING

## 2022-01-25 PROCEDURE — ? ADDITIONAL NOTES

## 2022-01-25 PROCEDURE — ? XTRAC LASER

## 2022-01-25 ASSESSMENT — LOCATION DETAILED DESCRIPTION DERM
LOCATION DETAILED: DORSAL PENILE SHAFT
LOCATION DETAILED: DORSAL GLANS

## 2022-01-25 ASSESSMENT — LOCATION ZONE DERM: LOCATION ZONE: PENIS

## 2022-01-25 ASSESSMENT — LOCATION SIMPLE DESCRIPTION DERM: LOCATION SIMPLE: PENIS

## 2022-01-25 NOTE — PROCEDURE: XTRAC LASER
Billing: [id number] (Unlisted special dermatological service or procedure) Billing: 14955 (Unlisted special dermatological service or procedure)

## 2022-01-31 ENCOUNTER — APPOINTMENT (RX ONLY)
Dept: URBAN - METROPOLITAN AREA CLINIC 40 | Facility: CLINIC | Age: 22
Setting detail: DERMATOLOGY
End: 2022-01-31

## 2022-01-31 DIAGNOSIS — L80 VITILIGO: ICD-10-CM

## 2022-01-31 PROCEDURE — ? ADDITIONAL NOTES

## 2022-01-31 PROCEDURE — ? COUNSELING

## 2022-01-31 PROCEDURE — ? XTRAC LASER

## 2022-01-31 PROCEDURE — 96999 UNLISTED SPEC DERM SVC/PX: CPT

## 2022-01-31 ASSESSMENT — LOCATION ZONE DERM: LOCATION ZONE: PENIS

## 2022-01-31 ASSESSMENT — LOCATION SIMPLE DESCRIPTION DERM: LOCATION SIMPLE: PENIS

## 2022-01-31 ASSESSMENT — LOCATION DETAILED DESCRIPTION DERM
LOCATION DETAILED: DORSAL GLANS
LOCATION DETAILED: DORSAL PENILE SHAFT

## 2022-01-31 NOTE — PROCEDURE: XTRAC LASER
Billing: [id number] (Unlisted special dermatological service or procedure) Billing: [id number] (Unlisted special dermatological service or procedure)

## 2022-02-08 ENCOUNTER — APPOINTMENT (RX ONLY)
Dept: URBAN - METROPOLITAN AREA CLINIC 40 | Facility: CLINIC | Age: 22
Setting detail: DERMATOLOGY
End: 2022-02-08

## 2022-02-08 DIAGNOSIS — L80 VITILIGO: ICD-10-CM

## 2022-02-08 PROCEDURE — 96999 UNLISTED SPEC DERM SVC/PX: CPT

## 2022-02-08 PROCEDURE — ? COUNSELING

## 2022-02-08 PROCEDURE — ? XTRAC LASER

## 2022-02-08 PROCEDURE — ? ADDITIONAL NOTES

## 2022-02-08 ASSESSMENT — LOCATION ZONE DERM: LOCATION ZONE: PENIS

## 2022-02-08 ASSESSMENT — LOCATION SIMPLE DESCRIPTION DERM: LOCATION SIMPLE: PENIS

## 2022-02-08 ASSESSMENT — LOCATION DETAILED DESCRIPTION DERM
LOCATION DETAILED: DORSAL GLANS
LOCATION DETAILED: DORSAL PENILE SHAFT

## 2022-02-08 NOTE — PROCEDURE: XTRAC LASER
Billing: [phone] (Unlisted special dermatological service or procedure) Billing: [id number] (Unlisted special dermatological service or procedure)

## 2022-02-09 NOTE — PROCEDURE: ADDITIONAL NOTES
Detail Level: Simple
Diabetic Eye Exam Form    Date Requested: 02/10/22  Patient: Lesley Garcia  Patient : 1937   Referring Provider: Mary Clark,     Dilated Retinal Exam, Optomap-Iris Exam, or Fundus Photography Done         Yes ([place] one above)         No     Date of Diabetic Eye Exam ______________________________  Left Eye      Exam did show retinopathy    Exam did not show retinopathy         Mild       Moderate       None       Proliferative       Severe     Right Eye     Exam did show retinopathy    Exam did not show retinopathy         Mild       Moderate       None       Proliferative       Severe     Comments __________________________________________________________    Practice Providing Exam ______________________________________________    Exam Performed By (print name) _______________________________________      Provider Signature ___________________________________________________      These reports are needed for  compliance  Please fax this completed form and a copy of the Diabetic Eye Exam report to our office located at [place] [address] as soon as possible via [phone] leigh Sethi: Phone [phone]    We thank you for your assistance in treating our mutual patient 
Additional Notes: Patient consent was obtained to proceed with the visit and recommended plan of care after discussion of all risks and benefits, including the risks of COVID-19 exposure.

## 2022-02-15 ENCOUNTER — APPOINTMENT (RX ONLY)
Dept: URBAN - METROPOLITAN AREA CLINIC 40 | Facility: CLINIC | Age: 22
Setting detail: DERMATOLOGY
End: 2022-02-15

## 2022-02-15 DIAGNOSIS — L80 VITILIGO: ICD-10-CM

## 2022-02-15 PROCEDURE — ? ADDITIONAL NOTES

## 2022-02-15 PROCEDURE — ? COUNSELING

## 2022-02-15 PROCEDURE — ? XTRAC LASER

## 2022-02-15 PROCEDURE — 96920 EXCIMER LSR PSRIASIS<250SQCM: CPT

## 2022-02-15 ASSESSMENT — LOCATION DETAILED DESCRIPTION DERM
LOCATION DETAILED: DORSAL PENILE SHAFT
LOCATION DETAILED: DORSAL GLANS

## 2022-02-15 ASSESSMENT — LOCATION ZONE DERM: LOCATION ZONE: PENIS

## 2022-02-15 ASSESSMENT — LOCATION SIMPLE DESCRIPTION DERM: LOCATION SIMPLE: PENIS

## 2022-02-17 ENCOUNTER — APPOINTMENT (RX ONLY)
Dept: URBAN - METROPOLITAN AREA CLINIC 40 | Facility: CLINIC | Age: 22
Setting detail: DERMATOLOGY
End: 2022-02-17

## 2022-02-17 DIAGNOSIS — L80 VITILIGO: ICD-10-CM

## 2022-02-17 PROCEDURE — 96920 EXCIMER LSR PSRIASIS<250SQCM: CPT

## 2022-02-17 PROCEDURE — ? ADDITIONAL NOTES

## 2022-02-17 PROCEDURE — ? XTRAC LASER

## 2022-02-17 PROCEDURE — ? COUNSELING

## 2022-02-17 ASSESSMENT — LOCATION DETAILED DESCRIPTION DERM
LOCATION DETAILED: DORSAL GLANS
LOCATION DETAILED: DORSAL PENILE SHAFT

## 2022-02-17 ASSESSMENT — LOCATION ZONE DERM: LOCATION ZONE: PENIS

## 2022-02-17 ASSESSMENT — LOCATION SIMPLE DESCRIPTION DERM: LOCATION SIMPLE: PENIS

## 2022-02-28 ENCOUNTER — APPOINTMENT (RX ONLY)
Dept: URBAN - METROPOLITAN AREA CLINIC 40 | Facility: CLINIC | Age: 22
Setting detail: DERMATOLOGY
End: 2022-02-28

## 2022-02-28 DIAGNOSIS — L80 VITILIGO: ICD-10-CM

## 2022-02-28 PROCEDURE — ? COUNSELING

## 2022-02-28 PROCEDURE — ? ADDITIONAL NOTES

## 2022-02-28 PROCEDURE — ? XTRAC LASER

## 2022-02-28 PROCEDURE — 96920 EXCIMER LSR PSRIASIS<250SQCM: CPT

## 2022-02-28 ASSESSMENT — LOCATION ZONE DERM: LOCATION ZONE: PENIS

## 2022-02-28 ASSESSMENT — LOCATION DETAILED DESCRIPTION DERM
LOCATION DETAILED: DORSAL GLANS
LOCATION DETAILED: DORSAL PENILE SHAFT

## 2022-02-28 ASSESSMENT — LOCATION SIMPLE DESCRIPTION DERM: LOCATION SIMPLE: PENIS

## 2022-03-03 ENCOUNTER — APPOINTMENT (RX ONLY)
Dept: URBAN - METROPOLITAN AREA CLINIC 40 | Facility: CLINIC | Age: 22
Setting detail: DERMATOLOGY
End: 2022-03-03

## 2022-03-03 DIAGNOSIS — L80 VITILIGO: ICD-10-CM

## 2022-03-03 PROCEDURE — ? XTRAC LASER

## 2022-03-03 PROCEDURE — ? ADDITIONAL NOTES

## 2022-03-03 PROCEDURE — ? COUNSELING

## 2022-03-03 PROCEDURE — 96920 EXCIMER LSR PSRIASIS<250SQCM: CPT

## 2022-03-03 ASSESSMENT — LOCATION ZONE DERM: LOCATION ZONE: PENIS

## 2022-03-03 ASSESSMENT — LOCATION DETAILED DESCRIPTION DERM
LOCATION DETAILED: DORSAL PENILE SHAFT
LOCATION DETAILED: DORSAL GLANS

## 2022-03-03 ASSESSMENT — LOCATION SIMPLE DESCRIPTION DERM: LOCATION SIMPLE: PENIS

## 2022-03-17 ENCOUNTER — APPOINTMENT (RX ONLY)
Dept: URBAN - METROPOLITAN AREA CLINIC 40 | Facility: CLINIC | Age: 22
Setting detail: DERMATOLOGY
End: 2022-03-17

## 2022-03-17 DIAGNOSIS — L80 VITILIGO: ICD-10-CM

## 2022-03-17 PROCEDURE — ? COUNSELING

## 2022-03-17 PROCEDURE — ? ADDITIONAL NOTES

## 2022-03-17 PROCEDURE — ? XTRAC LASER

## 2022-03-17 PROCEDURE — 96920 EXCIMER LSR PSRIASIS<250SQCM: CPT

## 2022-03-17 ASSESSMENT — LOCATION ZONE DERM: LOCATION ZONE: PENIS

## 2022-03-17 ASSESSMENT — LOCATION DETAILED DESCRIPTION DERM
LOCATION DETAILED: DORSAL PENILE SHAFT
LOCATION DETAILED: DORSAL GLANS

## 2022-03-17 ASSESSMENT — LOCATION SIMPLE DESCRIPTION DERM: LOCATION SIMPLE: PENIS

## 2022-03-22 ENCOUNTER — APPOINTMENT (RX ONLY)
Dept: URBAN - METROPOLITAN AREA CLINIC 40 | Facility: CLINIC | Age: 22
Setting detail: DERMATOLOGY
End: 2022-03-22

## 2022-03-22 DIAGNOSIS — L80 VITILIGO: ICD-10-CM

## 2022-03-22 PROCEDURE — 96920 EXCIMER LSR PSRIASIS<250SQCM: CPT

## 2022-03-22 PROCEDURE — ? COUNSELING

## 2022-03-22 PROCEDURE — ? XTRAC LASER

## 2022-03-22 PROCEDURE — ? ADDITIONAL NOTES

## 2022-03-22 ASSESSMENT — LOCATION ZONE DERM: LOCATION ZONE: PENIS

## 2022-03-22 ASSESSMENT — LOCATION DETAILED DESCRIPTION DERM
LOCATION DETAILED: DORSAL GLANS
LOCATION DETAILED: DORSAL PENILE SHAFT

## 2022-03-22 ASSESSMENT — LOCATION SIMPLE DESCRIPTION DERM: LOCATION SIMPLE: PENIS

## 2022-03-29 ENCOUNTER — APPOINTMENT (RX ONLY)
Dept: URBAN - METROPOLITAN AREA CLINIC 40 | Facility: CLINIC | Age: 22
Setting detail: DERMATOLOGY
End: 2022-03-29

## 2022-03-29 DIAGNOSIS — L80 VITILIGO: ICD-10-CM

## 2022-03-29 PROCEDURE — 96920 EXCIMER LSR PSRIASIS<250SQCM: CPT

## 2022-03-29 PROCEDURE — ? ADDITIONAL NOTES

## 2022-03-29 PROCEDURE — ? XTRAC LASER

## 2022-03-29 PROCEDURE — ? COUNSELING

## 2022-03-29 ASSESSMENT — LOCATION DETAILED DESCRIPTION DERM
LOCATION DETAILED: DORSAL PENILE SHAFT
LOCATION DETAILED: DORSAL GLANS

## 2022-03-29 ASSESSMENT — LOCATION SIMPLE DESCRIPTION DERM: LOCATION SIMPLE: PENIS

## 2022-03-29 ASSESSMENT — LOCATION ZONE DERM: LOCATION ZONE: PENIS

## 2022-03-31 ENCOUNTER — APPOINTMENT (RX ONLY)
Dept: URBAN - METROPOLITAN AREA CLINIC 40 | Facility: CLINIC | Age: 22
Setting detail: DERMATOLOGY
End: 2022-03-31

## 2022-03-31 DIAGNOSIS — L80 VITILIGO: ICD-10-CM

## 2022-03-31 PROCEDURE — ? XTRAC LASER

## 2022-03-31 PROCEDURE — 96920 EXCIMER LSR PSRIASIS<250SQCM: CPT

## 2022-03-31 PROCEDURE — ? ADDITIONAL NOTES

## 2022-03-31 PROCEDURE — ? COUNSELING

## 2022-03-31 ASSESSMENT — LOCATION DETAILED DESCRIPTION DERM
LOCATION DETAILED: DORSAL GLANS
LOCATION DETAILED: DORSAL PENILE SHAFT

## 2022-03-31 ASSESSMENT — LOCATION ZONE DERM: LOCATION ZONE: PENIS

## 2022-03-31 ASSESSMENT — LOCATION SIMPLE DESCRIPTION DERM: LOCATION SIMPLE: PENIS

## 2022-04-04 ENCOUNTER — APPOINTMENT (RX ONLY)
Dept: URBAN - METROPOLITAN AREA CLINIC 40 | Facility: CLINIC | Age: 22
Setting detail: DERMATOLOGY
End: 2022-04-04

## 2022-04-04 DIAGNOSIS — L80 VITILIGO: ICD-10-CM

## 2022-04-04 PROCEDURE — ? COUNSELING

## 2022-04-04 PROCEDURE — ? ADDITIONAL NOTES

## 2022-04-04 PROCEDURE — 96920 EXCIMER LSR PSRIASIS<250SQCM: CPT

## 2022-04-04 PROCEDURE — ? XTRAC LASER

## 2022-04-04 ASSESSMENT — LOCATION DETAILED DESCRIPTION DERM
LOCATION DETAILED: DORSAL PENILE SHAFT
LOCATION DETAILED: DORSAL GLANS

## 2022-04-04 ASSESSMENT — LOCATION ZONE DERM: LOCATION ZONE: PENIS

## 2022-04-04 ASSESSMENT — LOCATION SIMPLE DESCRIPTION DERM: LOCATION SIMPLE: PENIS

## 2022-04-19 ENCOUNTER — APPOINTMENT (RX ONLY)
Dept: URBAN - METROPOLITAN AREA CLINIC 40 | Facility: CLINIC | Age: 22
Setting detail: DERMATOLOGY
End: 2022-04-19

## 2022-04-19 DIAGNOSIS — L80 VITILIGO: ICD-10-CM

## 2022-04-19 PROCEDURE — ? XTRAC LASER

## 2022-04-19 PROCEDURE — ? ADDITIONAL NOTES

## 2022-04-19 PROCEDURE — 96920 EXCIMER LSR PSRIASIS<250SQCM: CPT

## 2022-04-19 PROCEDURE — ? COUNSELING

## 2022-04-19 ASSESSMENT — LOCATION SIMPLE DESCRIPTION DERM: LOCATION SIMPLE: PENIS

## 2022-04-19 ASSESSMENT — LOCATION ZONE DERM: LOCATION ZONE: PENIS

## 2022-04-19 ASSESSMENT — LOCATION DETAILED DESCRIPTION DERM
LOCATION DETAILED: DORSAL PENILE SHAFT
LOCATION DETAILED: DORSAL GLANS

## 2022-04-21 ENCOUNTER — APPOINTMENT (RX ONLY)
Dept: URBAN - METROPOLITAN AREA CLINIC 40 | Facility: CLINIC | Age: 22
Setting detail: DERMATOLOGY
End: 2022-04-21

## 2022-04-21 DIAGNOSIS — L80 VITILIGO: ICD-10-CM

## 2022-04-21 PROCEDURE — ? ADDITIONAL NOTES

## 2022-04-21 PROCEDURE — 96920 EXCIMER LSR PSRIASIS<250SQCM: CPT

## 2022-04-21 PROCEDURE — ? COUNSELING

## 2022-04-21 PROCEDURE — ? XTRAC LASER

## 2022-04-21 ASSESSMENT — LOCATION DETAILED DESCRIPTION DERM
LOCATION DETAILED: DORSAL PENILE SHAFT
LOCATION DETAILED: DORSAL GLANS

## 2022-04-21 ASSESSMENT — LOCATION SIMPLE DESCRIPTION DERM: LOCATION SIMPLE: PENIS

## 2022-04-21 ASSESSMENT — LOCATION ZONE DERM: LOCATION ZONE: PENIS

## 2022-04-26 ENCOUNTER — APPOINTMENT (RX ONLY)
Dept: URBAN - METROPOLITAN AREA CLINIC 40 | Facility: CLINIC | Age: 22
Setting detail: DERMATOLOGY
End: 2022-04-26

## 2022-04-26 DIAGNOSIS — L80 VITILIGO: ICD-10-CM

## 2022-04-26 PROCEDURE — 96920 EXCIMER LSR PSRIASIS<250SQCM: CPT

## 2022-04-26 PROCEDURE — ? XTRAC LASER

## 2022-04-26 PROCEDURE — ? COUNSELING

## 2022-04-26 ASSESSMENT — LOCATION DETAILED DESCRIPTION DERM
LOCATION DETAILED: DORSAL GLANS
LOCATION DETAILED: DORSAL PENILE SHAFT

## 2022-04-26 ASSESSMENT — LOCATION ZONE DERM: LOCATION ZONE: PENIS

## 2022-04-26 ASSESSMENT — LOCATION SIMPLE DESCRIPTION DERM: LOCATION SIMPLE: PENIS

## 2022-05-03 ENCOUNTER — APPOINTMENT (RX ONLY)
Dept: URBAN - METROPOLITAN AREA CLINIC 40 | Facility: CLINIC | Age: 22
Setting detail: DERMATOLOGY
End: 2022-05-03

## 2022-05-03 DIAGNOSIS — L80 VITILIGO: ICD-10-CM

## 2022-05-03 PROCEDURE — 96920 EXCIMER LSR PSRIASIS<250SQCM: CPT

## 2022-05-03 PROCEDURE — ? COUNSELING

## 2022-05-03 PROCEDURE — ? XTRAC LASER

## 2022-05-03 ASSESSMENT — LOCATION ZONE DERM: LOCATION ZONE: PENIS

## 2022-05-03 ASSESSMENT — LOCATION SIMPLE DESCRIPTION DERM: LOCATION SIMPLE: PENIS

## 2022-05-03 ASSESSMENT — LOCATION DETAILED DESCRIPTION DERM
LOCATION DETAILED: DORSAL PENILE SHAFT
LOCATION DETAILED: DORSAL GLANS

## 2022-05-10 ENCOUNTER — APPOINTMENT (RX ONLY)
Dept: URBAN - METROPOLITAN AREA CLINIC 40 | Facility: CLINIC | Age: 22
Setting detail: DERMATOLOGY
End: 2022-05-10

## 2022-05-10 DIAGNOSIS — L80 VITILIGO: ICD-10-CM

## 2022-05-10 PROCEDURE — 96920 EXCIMER LSR PSRIASIS<250SQCM: CPT

## 2022-05-10 PROCEDURE — ? XTRAC LASER

## 2022-05-10 PROCEDURE — ? COUNSELING

## 2022-05-10 ASSESSMENT — LOCATION SIMPLE DESCRIPTION DERM: LOCATION SIMPLE: PENIS

## 2022-05-10 ASSESSMENT — LOCATION ZONE DERM: LOCATION ZONE: PENIS

## 2022-05-10 ASSESSMENT — LOCATION DETAILED DESCRIPTION DERM
LOCATION DETAILED: DORSAL PENILE SHAFT
LOCATION DETAILED: DORSAL GLANS

## 2022-05-12 ENCOUNTER — APPOINTMENT (RX ONLY)
Dept: URBAN - METROPOLITAN AREA CLINIC 40 | Facility: CLINIC | Age: 22
Setting detail: DERMATOLOGY
End: 2022-05-12

## 2022-05-12 DIAGNOSIS — L80 VITILIGO: ICD-10-CM

## 2022-05-12 DIAGNOSIS — L80 VITILIGO: ICD-10-CM | Status: WELL CONTROLLED

## 2022-05-12 PROCEDURE — 99212 OFFICE O/P EST SF 10 MIN: CPT

## 2022-05-12 PROCEDURE — ? COUNSELING

## 2022-05-12 PROCEDURE — 96920 EXCIMER LSR PSRIASIS<250SQCM: CPT

## 2022-05-12 PROCEDURE — ? DIAGNOSIS COMMENT

## 2022-05-12 PROCEDURE — ? XTRAC LASER

## 2022-05-12 PROCEDURE — ? ADDITIONAL NOTES

## 2022-05-12 PROCEDURE — ? PHOTO-DOCUMENTATION

## 2022-05-12 ASSESSMENT — LOCATION DETAILED DESCRIPTION DERM
LOCATION DETAILED: DORSAL GLANS PENIS
LOCATION DETAILED: DORSAL PENILE SHAFT
LOCATION DETAILED: DORSAL GLANS

## 2022-05-12 ASSESSMENT — LOCATION ZONE DERM
LOCATION ZONE: PENIS
LOCATION ZONE: PENIS

## 2022-05-12 ASSESSMENT — LOCATION SIMPLE DESCRIPTION DERM
LOCATION SIMPLE: PENIS
LOCATION SIMPLE: PENIS

## 2022-05-12 NOTE — PROCEDURE: DIAGNOSIS COMMENT
Comment: - pt will be out of country 7/2022, return date undetermined
Render Risk Assessment In Note?: no
Detail Level: Simple

## 2022-05-12 NOTE — PROCEDURE: ADDITIONAL NOTES
Additional Notes: - continue Xtrac laser x2W then break x6M and rtc for reevaluation
Detail Level: Simple
Render Risk Assessment In Note?: no
normal for race

## 2022-05-24 ENCOUNTER — APPOINTMENT (RX ONLY)
Dept: URBAN - METROPOLITAN AREA CLINIC 40 | Facility: CLINIC | Age: 22
Setting detail: DERMATOLOGY
End: 2022-05-24

## 2022-05-24 DIAGNOSIS — L80 VITILIGO: ICD-10-CM

## 2022-05-24 PROCEDURE — ? ADDITIONAL NOTES

## 2022-05-24 PROCEDURE — ? XTRAC LASER

## 2022-05-24 PROCEDURE — 96920 EXCIMER LSR PSRIASIS<250SQCM: CPT

## 2022-05-24 PROCEDURE — ? COUNSELING

## 2022-05-24 PROCEDURE — ? PHOTO-DOCUMENTATION

## 2022-05-24 ASSESSMENT — LOCATION DETAILED DESCRIPTION DERM
LOCATION DETAILED: DORSAL PENILE SHAFT
LOCATION DETAILED: DORSAL GLANS

## 2022-05-24 ASSESSMENT — LOCATION SIMPLE DESCRIPTION DERM: LOCATION SIMPLE: PENIS

## 2022-05-24 ASSESSMENT — LOCATION ZONE DERM: LOCATION ZONE: PENIS

## 2022-05-26 ENCOUNTER — APPOINTMENT (RX ONLY)
Dept: URBAN - METROPOLITAN AREA CLINIC 40 | Facility: CLINIC | Age: 22
Setting detail: DERMATOLOGY
End: 2022-05-26

## 2022-05-26 DIAGNOSIS — L80 VITILIGO: ICD-10-CM

## 2022-05-26 PROCEDURE — ? PHOTO-DOCUMENTATION

## 2022-05-26 PROCEDURE — ? ADDITIONAL NOTES

## 2022-05-26 PROCEDURE — 96920 EXCIMER LSR PSRIASIS<250SQCM: CPT

## 2022-05-26 PROCEDURE — ? XTRAC LASER

## 2022-05-26 PROCEDURE — ? COUNSELING

## 2022-05-26 ASSESSMENT — LOCATION ZONE DERM: LOCATION ZONE: PENIS

## 2022-05-26 ASSESSMENT — LOCATION DETAILED DESCRIPTION DERM
LOCATION DETAILED: DORSAL GLANS
LOCATION DETAILED: DORSAL PENILE SHAFT

## 2022-05-26 ASSESSMENT — LOCATION SIMPLE DESCRIPTION DERM: LOCATION SIMPLE: PENIS

## 2022-05-31 ENCOUNTER — APPOINTMENT (RX ONLY)
Dept: URBAN - METROPOLITAN AREA CLINIC 40 | Facility: CLINIC | Age: 22
Setting detail: DERMATOLOGY
End: 2022-05-31

## 2022-05-31 DIAGNOSIS — L80 VITILIGO: ICD-10-CM

## 2022-05-31 PROCEDURE — 96920 EXCIMER LSR PSRIASIS<250SQCM: CPT

## 2022-05-31 PROCEDURE — ? PHOTO-DOCUMENTATION

## 2022-05-31 PROCEDURE — ? COUNSELING

## 2022-05-31 PROCEDURE — ? ADDITIONAL NOTES

## 2022-05-31 PROCEDURE — ? XTRAC LASER

## 2022-05-31 ASSESSMENT — LOCATION DETAILED DESCRIPTION DERM
LOCATION DETAILED: DORSAL GLANS
LOCATION DETAILED: DORSAL PENILE SHAFT

## 2022-05-31 ASSESSMENT — LOCATION SIMPLE DESCRIPTION DERM: LOCATION SIMPLE: PENIS

## 2022-05-31 ASSESSMENT — LOCATION ZONE DERM: LOCATION ZONE: PENIS

## 2022-05-31 NOTE — PROCEDURE: PHOTO-DOCUMENTATION
Called  and left message on VM that per Dr. Bernheim, patient needs to go to urgent care otherwise refer to [facility]. Gave [facility] phone  [phone] and  address at  [address] [place] [address] for patient. Also gave numbers of  Gyne on VM: Dr. Ruiz  and Dr. Samson .     
Detail Level: Zone
Photo Preface (Leave Blank If You Do Not Want): Photographs were obtained today

## 2022-06-09 ENCOUNTER — APPOINTMENT (RX ONLY)
Dept: URBAN - METROPOLITAN AREA CLINIC 40 | Facility: CLINIC | Age: 22
Setting detail: DERMATOLOGY
End: 2022-06-09

## 2022-06-09 DIAGNOSIS — L80 VITILIGO: ICD-10-CM

## 2022-06-09 PROCEDURE — ? XTRAC LASER

## 2022-06-09 PROCEDURE — 96920 EXCIMER LSR PSRIASIS<250SQCM: CPT

## 2022-06-09 PROCEDURE — ? COUNSELING

## 2022-06-09 PROCEDURE — ? ADDITIONAL NOTES

## 2022-06-09 ASSESSMENT — LOCATION DETAILED DESCRIPTION DERM
LOCATION DETAILED: DORSAL PENILE SHAFT
LOCATION DETAILED: DORSAL GLANS

## 2022-06-09 ASSESSMENT — LOCATION ZONE DERM: LOCATION ZONE: PENIS

## 2022-06-09 ASSESSMENT — LOCATION SIMPLE DESCRIPTION DERM: LOCATION SIMPLE: PENIS

## 2022-07-07 ENCOUNTER — APPOINTMENT (RX ONLY)
Dept: URBAN - METROPOLITAN AREA CLINIC 40 | Facility: CLINIC | Age: 22
Setting detail: DERMATOLOGY
End: 2022-07-07

## 2022-07-07 DIAGNOSIS — L80 VITILIGO: ICD-10-CM | Status: IMPROVED

## 2022-07-07 PROCEDURE — ? PHOTO-DOCUMENTATION

## 2022-07-07 PROCEDURE — ? DIAGNOSIS COMMENT

## 2022-07-07 PROCEDURE — 99213 OFFICE O/P EST LOW 20 MIN: CPT

## 2022-07-07 PROCEDURE — ? ADDITIONAL NOTES

## 2022-07-07 PROCEDURE — ? COUNSELING

## 2022-07-07 ASSESSMENT — LOCATION DETAILED DESCRIPTION DERM: LOCATION DETAILED: DORSAL PENILE SHAFT

## 2022-07-07 ASSESSMENT — LOCATION ZONE DERM: LOCATION ZONE: PENIS

## 2022-07-07 ASSESSMENT — LOCATION SIMPLE DESCRIPTION DERM: LOCATION SIMPLE: PENIS

## 2022-07-07 NOTE — PROCEDURE: DIAGNOSIS COMMENT
Comment: Patient has seen improvement with XTRAC. RTC for laser in 6M. Patient traveling to [facility] this summer for soccer tryouts Comment: Patient has seen improvement with XTRAC. RTC for laser in 6M. Patient traveling to [facility] this summer for soccer tryouts

## 2023-01-19 ENCOUNTER — APPOINTMENT (RX ONLY)
Dept: URBAN - METROPOLITAN AREA CLINIC 40 | Facility: CLINIC | Age: 23
Setting detail: DERMATOLOGY
End: 2023-01-19

## 2023-01-19 DIAGNOSIS — L80 VITILIGO: ICD-10-CM | Status: RESOLVING

## 2023-01-19 PROCEDURE — 99213 OFFICE O/P EST LOW 20 MIN: CPT

## 2023-01-19 PROCEDURE — ? COUNSELING

## 2023-01-19 PROCEDURE — ? DIAGNOSIS COMMENT

## 2023-01-19 PROCEDURE — ? PRESCRIPTION

## 2023-01-19 PROCEDURE — ? ADDITIONAL NOTES

## 2023-01-19 RX ORDER — TACROLIMUS 1 MG/G
OINTMENT TOPICAL
Qty: 60 | Refills: 2 | Status: ERX | COMMUNITY
Start: 2023-01-19

## 2023-01-19 RX ADMIN — TACROLIMUS: 1 OINTMENT TOPICAL at 00:00

## 2023-01-19 ASSESSMENT — LOCATION DETAILED DESCRIPTION DERM: LOCATION DETAILED: DORSAL PENILE SHAFT

## 2023-01-19 ASSESSMENT — LOCATION ZONE DERM: LOCATION ZONE: PENIS

## 2023-01-19 ASSESSMENT — LOCATION SIMPLE DESCRIPTION DERM: LOCATION SIMPLE: PENIS

## 2023-01-19 NOTE — PROCEDURE: DIAGNOSIS COMMENT
Comment: Patient has seen improvement with XTRAC. RTC for laser in 6M. Patient traveling to [facility] this summer for soccer tryouts Comment: Patient has seen improvement with XTRAC. RTC for laser in 6M. Patient traveling to [facility] this summer for soccer tryouts

## 2023-08-23 NOTE — PROCEDURE: XTRAC LASER
I called patient, no answer, lvm for patient to return call to office   % Increase/Decrease From Last Treatment: +50
